# Patient Record
Sex: MALE | Race: BLACK OR AFRICAN AMERICAN | NOT HISPANIC OR LATINO | URBAN - METROPOLITAN AREA
[De-identification: names, ages, dates, MRNs, and addresses within clinical notes are randomized per-mention and may not be internally consistent; named-entity substitution may affect disease eponyms.]

---

## 2017-02-18 PROCEDURE — 74176 CT ABD & PELVIS W/O CONTRAST: CPT | Mod: 26

## 2017-02-18 PROCEDURE — 99284 EMERGENCY DEPT VISIT MOD MDM: CPT | Mod: 25

## 2017-02-19 ENCOUNTER — OUTPATIENT (OUTPATIENT)
Dept: OUTPATIENT SERVICES | Facility: HOSPITAL | Age: 57
LOS: 1 days | End: 2017-02-19

## 2017-02-19 ENCOUNTER — INPATIENT (INPATIENT)
Facility: HOSPITAL | Age: 57
LOS: 7 days | Discharge: HOME | End: 2017-02-27
Payer: MEDICAID

## 2017-02-19 PROCEDURE — 74177 CT ABD & PELVIS W/CONTRAST: CPT | Mod: 26

## 2017-02-19 PROCEDURE — 76705 ECHO EXAM OF ABDOMEN: CPT | Mod: 26

## 2017-02-20 ENCOUNTER — OUTPATIENT (OUTPATIENT)
Dept: OUTPATIENT SERVICES | Facility: HOSPITAL | Age: 57
LOS: 1 days | End: 2017-02-20

## 2017-02-21 ENCOUNTER — OUTPATIENT (OUTPATIENT)
Dept: OUTPATIENT SERVICES | Facility: HOSPITAL | Age: 57
LOS: 1 days | End: 2017-02-21

## 2017-02-21 PROCEDURE — 72146 MRI CHEST SPINE W/O DYE: CPT | Mod: 26

## 2017-02-21 PROCEDURE — 72148 MRI LUMBAR SPINE W/O DYE: CPT | Mod: 26

## 2017-02-22 ENCOUNTER — OUTPATIENT (OUTPATIENT)
Dept: OUTPATIENT SERVICES | Facility: HOSPITAL | Age: 57
LOS: 1 days | End: 2017-02-22

## 2017-02-22 PROCEDURE — 71020: CPT | Mod: 26

## 2017-02-23 ENCOUNTER — OUTPATIENT (OUTPATIENT)
Dept: OUTPATIENT SERVICES | Facility: HOSPITAL | Age: 57
LOS: 1 days | End: 2017-02-23

## 2017-02-24 ENCOUNTER — OUTPATIENT (OUTPATIENT)
Dept: OUTPATIENT SERVICES | Facility: HOSPITAL | Age: 57
LOS: 1 days | End: 2017-02-24

## 2017-02-25 ENCOUNTER — OUTPATIENT (OUTPATIENT)
Dept: OUTPATIENT SERVICES | Facility: HOSPITAL | Age: 57
LOS: 1 days | End: 2017-02-25

## 2017-02-25 PROCEDURE — 76700 US EXAM ABDOM COMPLETE: CPT | Mod: 26

## 2017-02-26 ENCOUNTER — OUTPATIENT (OUTPATIENT)
Dept: OUTPATIENT SERVICES | Facility: HOSPITAL | Age: 57
LOS: 1 days | End: 2017-02-26

## 2017-02-27 ENCOUNTER — OUTPATIENT (OUTPATIENT)
Dept: OUTPATIENT SERVICES | Facility: HOSPITAL | Age: 57
LOS: 1 days | End: 2017-02-27

## 2017-07-11 ENCOUNTER — OUTPATIENT (OUTPATIENT)
Dept: OUTPATIENT SERVICES | Facility: HOSPITAL | Age: 57
LOS: 1 days | End: 2017-07-11

## 2017-07-11 ENCOUNTER — APPOINTMENT (OUTPATIENT)
Dept: CARDIOLOGY | Facility: CLINIC | Age: 57
End: 2017-07-11

## 2017-07-12 ENCOUNTER — OUTPATIENT (OUTPATIENT)
Dept: OUTPATIENT SERVICES | Facility: HOSPITAL | Age: 57
LOS: 1 days | End: 2017-07-12

## 2018-09-16 ENCOUNTER — EMERGENCY (EMERGENCY)
Facility: HOSPITAL | Age: 58
LOS: 1 days | End: 2018-09-16
Payer: MEDICAID

## 2018-09-16 PROCEDURE — 73130 X-RAY EXAM OF HAND: CPT | Mod: 26,RT

## 2018-09-16 PROCEDURE — 99284 EMERGENCY DEPT VISIT MOD MDM: CPT

## 2018-09-16 PROCEDURE — 70450 CT HEAD/BRAIN W/O DYE: CPT | Mod: 26

## 2018-10-06 ENCOUNTER — TRANSCRIPTION ENCOUNTER (OUTPATIENT)
Age: 58
End: 2018-10-06

## 2019-01-14 ENCOUNTER — APPOINTMENT (OUTPATIENT)
Dept: ORTHOPEDIC SURGERY | Facility: CLINIC | Age: 59
End: 2019-01-14

## 2022-02-23 ENCOUNTER — OUTPATIENT (OUTPATIENT)
Dept: OUTPATIENT SERVICES | Facility: HOSPITAL | Age: 62
LOS: 1 days | End: 2022-02-23

## 2022-02-23 DIAGNOSIS — M25.511 PAIN IN RIGHT SHOULDER: ICD-10-CM

## 2022-04-14 ENCOUNTER — OUTPATIENT (OUTPATIENT)
Dept: OUTPATIENT SERVICES | Facility: HOSPITAL | Age: 62
LOS: 1 days | End: 2022-04-14
Payer: OTHER MISCELLANEOUS

## 2022-04-14 DIAGNOSIS — Z01.812 ENCOUNTER FOR PREPROCEDURAL LABORATORY EXAMINATION: ICD-10-CM

## 2022-04-14 DIAGNOSIS — Z01.810 ENCOUNTER FOR PREPROCEDURAL CARDIOVASCULAR EXAMINATION: ICD-10-CM
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
## 2022-04-14 DIAGNOSIS — M25.511 PAIN IN RIGHT SHOULDER: ICD-10-CM
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
## 2022-04-14 DIAGNOSIS — M67.819 OTHER SPECIFIED DISORDERS OF SYNOVIUM AND TENDON, UNSPECIFIED SHOULDER: ICD-10-CM

## 2022-04-14 DIAGNOSIS — M19.011 PRIMARY OSTEOARTHRITIS, RIGHT SHOULDER: ICD-10-CM
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
## 2022-04-14 DIAGNOSIS — S46.219A STRAIN OF MUSCLE, FASCIA AND TENDON OF OTHER PARTS OF BICEPS, UNSPECIFIED ARM, INITIAL ENCOUNTER: ICD-10-CM

## 2022-04-14 DIAGNOSIS — S46.011A STRAIN OF MUSCLE(S) AND TENDON(S) OF THE ROTATOR CUFF OF RIGHT SHOULDER, INITIAL ENCOUNTER: ICD-10-CM

## 2022-04-14 DIAGNOSIS — M19.019 PRIMARY OSTEOARTHRITIS, UNSPECIFIED SHOULDER: ICD-10-CM

## 2022-04-14 PROCEDURE — 93010 ELECTROCARDIOGRAM REPORT: CPT

## 2022-04-28 ENCOUNTER — OUTPATIENT (OUTPATIENT)
Dept: OUTPATIENT SERVICES | Facility: HOSPITAL | Age: 62
LOS: 1 days | End: 2022-04-28
Payer: OTHER MISCELLANEOUS

## 2022-04-28 ENCOUNTER — OUTPATIENT (OUTPATIENT)
Dept: OUTPATIENT SERVICES | Facility: HOSPITAL | Age: 62
LOS: 1 days | End: 2022-04-28

## 2022-04-28 PROCEDURE — 88304 TISSUE EXAM BY PATHOLOGIST: CPT | Mod: 26

## 2022-05-03 DIAGNOSIS — M19.111 POST-TRAUMATIC OSTEOARTHRITIS, RIGHT SHOULDER: ICD-10-CM

## 2022-05-03 DIAGNOSIS — M75.101 UNSPECIFIED ROTATOR CUFF TEAR OR RUPTURE OF RIGHT SHOULDER, NOT SPECIFIED AS TRAUMATIC: ICD-10-CM

## 2022-05-03 DIAGNOSIS — I10 ESSENTIAL (PRIMARY) HYPERTENSION: ICD-10-CM

## 2022-05-03 DIAGNOSIS — M75.41 IMPINGEMENT SYNDROME OF RIGHT SHOULDER: ICD-10-CM

## 2022-05-12 DIAGNOSIS — M75.111 INCOMPLETE ROTATOR CUFF TEAR OR RUPTURE OF RIGHT SHOULDER, NOT SPECIFIED AS TRAUMATIC: ICD-10-CM

## 2022-07-11 DIAGNOSIS — M25.551 PAIN IN RIGHT HIP: ICD-10-CM

## 2022-07-12 ENCOUNTER — APPOINTMENT (OUTPATIENT)
Dept: ORTHOPEDIC SURGERY | Facility: CLINIC | Age: 62
End: 2022-07-12

## 2022-07-27 ENCOUNTER — APPOINTMENT (OUTPATIENT)
Dept: ORTHOPEDIC SURGERY | Facility: CLINIC | Age: 62
End: 2022-07-27

## 2022-07-27 VITALS — WEIGHT: 195 LBS | HEIGHT: 66 IN | BODY MASS INDEX: 31.34 KG/M2

## 2022-07-27 DIAGNOSIS — I10 ESSENTIAL (PRIMARY) HYPERTENSION: ICD-10-CM

## 2022-07-27 PROCEDURE — 99203 OFFICE O/P NEW LOW 30 MIN: CPT

## 2022-07-27 PROCEDURE — 72100 X-RAY EXAM L-S SPINE 2/3 VWS: CPT

## 2022-07-27 PROCEDURE — 99072 ADDL SUPL MATRL&STAF TM PHE: CPT

## 2022-07-27 NOTE — WORK
[Sprain/Strain] : sprain/strain [Was the competent medical cause of the injury] : was the competent medical cause of the injury [Are consistent with the injury] : are consistent with the injury [Consistent with my objective findings] : consistent with my objective findings [Severe Partial] : severe partial [Reveals pre-existing condition(s) that may affect treatment/prognosis] : reveals pre-existing condition(s) that may affect treatment/prognosis [Cannot return to work because ________] : cannot return to work because [unfilled] [Bending/Twisting] : bending/twisting [Climbing stairs/Ladders] : climbing stairs/ladders [Lifting] : lifting [Walking] : walking [Pulling/Pushing] : pulling/pushing [Standing] : standing [15+ days] : 15+ days [Patient] : patient [No] : No [No Rx restrictions] : No Rx restrictions. [I provided the services listed above] :  I provided the services listed above. [FreeTextEntry1] : fair [FreeTextEntry2] : prior T12 laminectomy.  Prior lumbar discectomy

## 2022-07-27 NOTE — DATA REVIEWED
[Lumbar Spine] : lumbar spine [MRI] : MRI [Right] : of the right [Hip] : hip [Report was reviewed and noted in the chart] : The report was reviewed and noted in the chart [FreeTextEntry1] : REPORT ONLY Lumbar spine MRI taken (04/26/22) - [facility] \par L5-S1 mild foraminal stenosis and prior left laminectomy \par L4-5 mild foraminal stenosis \par L3-4 disc bulge mild stenosis \par L2-3 disc bulge, mild stenosis \par L1-2 normal   [FreeTextEntry2] : (05/02/22) - [facility]: Normal

## 2022-07-27 NOTE — PHYSICAL EXAM
[5___] : right extensor hallicus longus 5[unfilled]/5 [Outside films reviewed] : Outside films reviewed [4___] : right plantor flexors 4[unfilled]/5 [de-identified] : Constitutional:\par -  General Appearance: \par Unremarkable\par Body Habitus\par Well Developed \par Well Nourished\par Body Habitus\par No Deformities\par Well Groomed\par \par Ability To communicate:\par Normal\par \par Neurologic: \par Global sensation is intact to upper and lower extremities.  See examination of Neck and/or Spine for exceptions.\par Orientation to Time, Place and Person is: Normal\par Mood And Affect is Normal\par \par Skin:\par -  Head/Face, Right Upper/Lower Extremity, Left Upper/Lower Extremity: Normal\par See Examination of Neck and/or Spine for exceptions\par \par Cardiovascular:\par Peripheral Cardiovascular System is Normal\par \par Palpation of Lymph Nodes:\par Normal Palpation of lymph nodes in: Axilla, Cervical, Inguinal\par Abnormal Palpation of lymph nodes in: None  [] : non-antalgic [FreeTextEntry3] : incision in the lower thoracic spine and lumbar spine  [de-identified] : Right L5 and S1 paresthesias [FreeTextEntry1] : Lumbar Spine X-Rays Taken (07/25/22) - [facility]HOUSE [facility]\par On my interpretation of the images\par 1) T11-12 Laminectomy\par 2) Multi-level degenerative disease throughout the lumbar spine [TWNoteComboBox7] : forward flexion 30 degrees [de-identified] : extension 10 degrees [de-identified] : left lateral bending 20 degrees [de-identified] : left lateral rotation 20 degrees [de-identified] : right lateral bending 20 degrees [TWNoteComboBox6] : right lateral rotation 20 degrees

## 2022-07-27 NOTE — HISTORY OF PRESENT ILLNESS
[Work related] : work related [Sudden] : sudden [10] : 10 [9] : 9 [Radiating] : radiating [Sharp] : sharp [Shooting] : shooting [Tingling] : tingling [Constant] : constant [Nothing helps with pain getting better] : Nothing helps with pain getting better [Walking] : walking [Lying in bed] : lying in bed [Not working due to injury] : Work status: not working due to injury [de-identified] : 63 y/o male presents for his initial consultation. This is a worker's comp visit. Patient was previously evaluated by me at [place]. Patient c/o lower back pain that started on 03/07/22 while working at [employer]. Patient reports that he begun to experience lower back pain after lifting an 80 lbs. box. Patient c/o lower back and right leg pain that has been present since the accident in March. Patient has been undergoing PT which he did not find helpful. Patient has undergone TPI's with no relief. Patient c/o painful crepitus with ROM in his lower back.\par \par W/c DOI: 03/07/22\par Occupation: [employer] \par Working Status: OOW [] : no [FreeTextEntry3] : 3/7/22 [FreeTextEntry5] : Patient picked up a heavy box and heard a popping sound in back [FreeTextEntry7] : right leg [de-identified] : 3/10/22 [de-identified] : urgent care [de-identified] : MRI done at [facility] [de-identified] : shira

## 2022-07-27 NOTE — ASSESSMENT
[FreeTextEntry1] : 61 y/o male with lumbar radiculopathy. I instructed the patient to obtain a copy of his last MRI for my review.  He's already been through PT under the direction of Dr. Michele.  Patient was prescribed diclofenac for pain relief. I would like to follow up with the patient in 1-2 weeks to assess his previous records. \par \par Prior to appointment and during encounter with patient extensive medical records were reviewed including but not limited to, hospital records, out patient records, imaging results, and lab data. During this appointment the patient was examined, diagnoses were discussed and explained in a face to face manner. In addition extensive time was spent reviewing aforementioned diagnostic studies. Counseling including abnormal image results, differential diagnoses, treatment options, risk and benefits, lifestyle changes, current condition, and current medications was performed. Patient's comments, questions, and concerns were address and patient verbalized understanding. Based on this patient's presentation at our office, which is an orthopedic spine surgeon's office, this patient inherently / intrinsically has a risk, however minute, of developing issues such as Cauda equina syndrome, bowel and bladder changes, or progression of motor or neurological deficits such as paralysis which may be permanent.\par \par I, Kendall Nieves, attest that this documentation has been prepared under the direction and in the presence of provider Philipp Wilson MD.

## 2022-08-03 ENCOUNTER — APPOINTMENT (OUTPATIENT)
Dept: ORTHOPEDIC SURGERY | Facility: CLINIC | Age: 62
End: 2022-08-03

## 2022-08-03 VITALS — BODY MASS INDEX: 31.34 KG/M2 | WEIGHT: 195 LBS | HEIGHT: 66 IN

## 2022-08-03 PROCEDURE — 99072 ADDL SUPL MATRL&STAF TM PHE: CPT

## 2022-08-03 PROCEDURE — 99214 OFFICE O/P EST MOD 30 MIN: CPT

## 2022-08-12 NOTE — PHYSICAL EXAM
[NL (90)] : forward flexion 90 degrees [NL (30)] : right lateral rotation 30 degrees [NL (45)] : extension 45 degrees [NL (40)] : right lateral bending 40 degrees [5___] : right extensor hallicus longus 5[unfilled]/5 [de-identified] : Constitutional:\par -  General Appearance: \par Unremarkable\par Body Habitus\par Well Developed \par Well Nourished\par Body Habitus\par No Deformities\par Well Groomed\par \par Ability To communicate:\par Normal\par \par Neurologic: \par Global sensation is intact to upper and lower extremities.  See examination of Neck and/or Spine for exceptions.\par Orientation to Time, Place and Person is: Normal\par Mood And Affect is Normal\par \par Skin:\par -  Head/Face, Right Upper/Lower Extremity, Left Upper/Lower Extremity: Normal\par See Examination of Neck and/or Spine for exceptions\par \par Cardiovascular:\par Peripheral Cardiovascular System is Normal\par \par Palpation of Lymph Nodes:\par Normal Palpation of lymph nodes in: Axilla, Cervical, Inguinal\par Abnormal Palpation of lymph nodes in: None  [] : non-antalgic

## 2022-08-12 NOTE — HISTORY OF PRESENT ILLNESS
[10] : 10 [Not working due to injury] : Work status: not working due to injury [de-identified] : 63 y/o male presents for re-evaluation. This is a worker's comp visit. Patient c/o lower back pain radiating into his right calf. Patient c/o paresthesias in his lower extremity. Patient previously underwent LUÍS's with Dr. Clinton which he did not find helpful in reducing his pain. \par \par W/c DOI: 03/07/22\par Occupation: [employer] \par Working Status: OOW  [] : no

## 2022-08-12 NOTE — WORK
[Total] : total [Cannot return to work because ________] : cannot return to work because [unfilled] [Patient] : patient [No Rx restrictions] : No Rx restrictions. [I provided the services listed above] :  I provided the services listed above. [FreeTextEntry1] : temporary

## 2022-08-12 NOTE — ASSESSMENT
[FreeTextEntry1] : 61 y/o male with lumbar stenosis. As the patient has been experiencing worsening lower back and lower extremity pain over the past 6 months that is refractory to conservative treatments consist of pain management intervention and PT, and as the patient has evidence for prior discectomy, I am requesting an updated lumbar MRI with and without contrast to evaluate for progressive stenosis in consideration of surgical intervention. I would like to follow up with the patient in 3-4 weeks to assess his MRI results. \par \par Prior to appointment and during encounter with patient extensive medical records were reviewed including but not limited to, hospital records, out patient records, imaging results, and lab data. During this appointment the patient was examined, diagnoses were discussed and explained in a face to face manner. In addition extensive time was spent reviewing aforementioned diagnostic studies. Counseling including abnormal image results, differential diagnoses, treatment options, risk and benefits, lifestyle changes, current condition, and current medications was performed. Patient's comments, questions, and concerns were address and patient verbalized understanding. Based on this patient's presentation at our office, which is an orthopedic spine surgeon's office, this patient inherently / intrinsically has a risk, however minute, of developing issues such as Cauda equina syndrome, bowel and bladder changes, or progression of motor or neurological deficits such as paralysis which may be permanent.\par \par I, Kendall Nieves, attest that this documentation has been prepared under the direction and in the presence of provider Philipp Wilson MD.

## 2022-09-04 ENCOUNTER — FORM ENCOUNTER (OUTPATIENT)
Age: 62
End: 2022-09-04

## 2022-09-05 ENCOUNTER — APPOINTMENT (OUTPATIENT)
Dept: MRI IMAGING | Facility: CLINIC | Age: 62
End: 2022-09-05

## 2022-09-05 PROCEDURE — 72158 MRI LUMBAR SPINE W/O & W/DYE: CPT

## 2022-09-05 PROCEDURE — 99072 ADDL SUPL MATRL&STAF TM PHE: CPT

## 2022-09-09 ENCOUNTER — APPOINTMENT (OUTPATIENT)
Dept: ORTHOPEDIC SURGERY | Facility: CLINIC | Age: 62
End: 2022-09-09

## 2022-09-09 VITALS — HEIGHT: 66 IN | WEIGHT: 195 LBS | BODY MASS INDEX: 31.34 KG/M2

## 2022-09-09 DIAGNOSIS — M54.16 RADICULOPATHY, LUMBAR REGION: ICD-10-CM

## 2022-09-09 DIAGNOSIS — Z78.9 OTHER SPECIFIED HEALTH STATUS: ICD-10-CM

## 2022-09-09 DIAGNOSIS — F17.200 NICOTINE DEPENDENCE, UNSPECIFIED, UNCOMPLICATED: ICD-10-CM

## 2022-09-09 PROCEDURE — 99072 ADDL SUPL MATRL&STAF TM PHE: CPT

## 2022-09-09 PROCEDURE — 99214 OFFICE O/P EST MOD 30 MIN: CPT

## 2022-09-09 NOTE — DATA REVIEWED
[MRI] : MRI [Lumbar Spine] : lumbar spine [I independently reviewed and interpreted images and report] : I independently reviewed and interpreted images and report [FreeTextEntry1] : Lab work done on 08/31/2022 - [facility] \par Wbc 9.9\par esr 43.0\par CRP 0.4

## 2022-09-09 NOTE — HISTORY OF PRESENT ILLNESS
[de-identified] : 63 y/o male presents for MRI follow up. Eve reports that on July 26, 2022 he underwent an LUÍS with [facility], on 08/05/22, he presented to [place] for difficulty walking. Patient reports that he was evaluated by a neurosurgeon. Patient was given an IV Pic line and did not undergo surgery. Patient c/o lower back pain radiating into his right lower extremity. Patient reports that I&D advised him that the infection was improving. \par \par W/c DOI: 03/07/22\par Occupation: [employer] \par Working Status: OOW

## 2022-09-09 NOTE — ASSESSMENT
[FreeTextEntry1] : 61 y/o male with RLE radiculopathy from foraminal stenosis and R foraminal HNP.  This is a worker's comp visit.  He is s/p LUÍS with an outside PM provider in July and after last seen in the office proceeded to [facility] ED and started treatment for a spine infection.  Was consulted on by neurosurgery and was decided that he did not need surgical intervention.  ID is treating with IV abx via PICC line.  Sonu does not have all the details of treatment with him and I'm having some trouble piecing together the timeline.  \par \par I advised the patient to obtain a copy of the MRI CD and Labs done in [facility]. I discussed the importance of comparing his MRI taken during his possible active infection against his current MRI (which now in retrospect) is several weeks s/p abx treatment.  I advised the patient to continue his treatment with Infectious disease. I discussed with the patient that if he is refractory to conservative treatment then he may be a potential surgical candidate for laminectomy and fusion to relieve the foraminal stenosis.   I would like to follow up with the patient in 4-6 weeks  to assess his response to conservative care. \par \par Prior to appointment and during encounter with patient extensive medical records were reviewed including but not limited to, hospital records, out patient records, imaging results, and lab data. During this appointment the patient was examined, diagnoses were discussed and explained in a face to face manner. In addition extensive time was spent reviewing aforementioned diagnostic studies. Counseling including abnormal image results, differential diagnoses, treatment options, risk and benefits, lifestyle changes, current condition, and current medications was performed. Patient's comments, questions, and concerns were address and patient verbalized understanding. Based on this patient's presentation at our office, which is an orthopedic spine surgeon's office, this patient inherently / intrinsically has a risk, however minute, of developing issues such as Cauda equina syndrome, bowel and bladder changes, or progression of motor or neurological deficits such as paralysis which may be permanent.\par \par I, Kendall Nieves, attest that this documentation has been prepared under the direction and in the presence of provider Philipp Wilson MD.

## 2022-09-09 NOTE — PHYSICAL EXAM
[NL (90)] : forward flexion 90 degrees [NL (30)] : right lateral rotation 30 degrees [NL (45)] : extension 45 degrees [NL (40)] : right lateral bending 40 degrees [5___] : right extensor hallicus longus 5[unfilled]/5 [de-identified] : Constitutional:\par -  General Appearance: \par Unremarkable\par Body Habitus\par Well Developed \par Well Nourished\par Body Habitus\par No Deformities\par Well Groomed\par \par Ability To communicate:\par Normal\par \par Neurologic: \par Global sensation is intact to upper and lower extremities.  See examination of Neck and/or Spine for exceptions.\par Orientation to Time, Place and Person is: Normal\par Mood And Affect is Normal\par \par Skin:\par -  Head/Face, Right Upper/Lower Extremity, Left Upper/Lower Extremity: Normal\par See Examination of Neck and/or Spine for exceptions\par \par Cardiovascular:\par Peripheral Cardiovascular System is Normal\par \par Palpation of Lymph Nodes:\par Normal Palpation of lymph nodes in: Axilla, Cervical, Inguinal\par Abnormal Palpation of lymph nodes in: None  [] : non-antalgic [de-identified] : Right L4 and L5 paresthesias

## 2022-09-16 ENCOUNTER — NON-APPOINTMENT (OUTPATIENT)
Age: 62
End: 2022-09-16

## 2022-10-07 ENCOUNTER — APPOINTMENT (OUTPATIENT)
Dept: ORTHOPEDIC SURGERY | Facility: CLINIC | Age: 62
End: 2022-10-07

## 2022-10-07 VITALS — HEIGHT: 66 IN | BODY MASS INDEX: 31.34 KG/M2 | WEIGHT: 195 LBS

## 2022-10-07 PROCEDURE — 99072 ADDL SUPL MATRL&STAF TM PHE: CPT

## 2022-10-07 PROCEDURE — 99214 OFFICE O/P EST MOD 30 MIN: CPT

## 2022-10-07 NOTE — PHYSICAL EXAM
[NL (90)] : forward flexion 90 degrees [NL (30)] : right lateral bending 30 degrees [NL (45)] : extension 45 degrees [NL (40)] : right lateral bending 40 degrees [5___] : right extensor hallicus longus 5[unfilled]/5 [de-identified] : Constitutional:\par -  General Appearance: \par Unremarkable\par Body Habitus\par Well Developed \par Well Nourished\par Body Habitus\par No Deformities\par Well Groomed\par \par Ability To communicate:\par Normal\par \par Neurologic: \par Global sensation is intact to upper and lower extremities.  See examination of Neck and/or Spine for exceptions.\par Orientation to Time, Place and Person is: Normal\par Mood And Affect is Normal\par \par Skin:\par -  Head/Face, Right Upper/Lower Extremity, Left Upper/Lower Extremity: Normal\par See Examination of Neck and/or Spine for exceptions\par \par Cardiovascular:\par Peripheral Cardiovascular System is Normal\par \par Palpation of Lymph Nodes:\par Normal Palpation of lymph nodes in: Axilla, Cervical, Inguinal\par Abnormal Palpation of lymph nodes in: None  [] : non-antalgic [FreeTextEntry3] : healed incision at L5-S1 [de-identified] : Right L4-L5 paresthesias

## 2022-10-07 NOTE — DATA REVIEWED
[Erythrocyte Sedimentation Rate] : Erythrocyte Sedimentation Rate [C-reactive Protein] : C-reactive Protein [Laboratory studies were reviewed and noted in the chart] : Laboratory studies were reviewed and noted in the chart [Other: _____] : [unfilled] [MRI] : MRI [Lumbar Spine] : lumbar spine [Report was reviewed and noted in the chart] : The report was reviewed and noted in the chart [I independently reviewed and interpreted images and report] : I independently reviewed and interpreted images and report [I reviewed the films/CD and additionally noted] : I reviewed the films/CD and additionally noted [FreeTextEntry1] : Imaging Review:\par MRI Lumbar spine 09/01/2022 - [facility]\par L4-L5 abnormal soft tissue enhancement around right L4 nerve in foramin \par \par MRI Lumbar spine 09/05/2022 - [facility] \par L5-S1 prior discectomy left side\par L4-L5 bilateral facet arthitis \par Severe right side foraminal stenosis L4 nerve root compression \par Contrast enhancement in right foraminal region over the L4 nerve root \par Mild to moderate multilevel DDD\par \par MRI Lumbar spine 09/15/2022 - [facility]\par Right L4-L5 swelling and a possibility for infection \par \par Laboratory Studies:\par 9/14/2022 \par ESR 50, elevated\par CRP 0.5 normal\par \par 9/19/2022\par WBC 6.1 normal

## 2022-10-07 NOTE — ASSESSMENT
[FreeTextEntry1] : 63 y/o male with RLE radiculopathy from foraminal stenosis and R foraminal HNP.  This is a worker's comp visit.  I advised the patient to continue his treatment with Infectious disease. I discussed with the patient that if he is refractory to conservative treatment then he may be a potential surgical candidate. Discussed at length non-operative and operative treatment options including L4-L5 laminectomy fusion with full right facet resection. Advised him that we should hold off on surgical intervention to confirm infection resolves before operating.  All questions and concerns regarding the surgery were addressed. Follow up in 3 weeks to further discuss surgical intervention.\par \par Prior to appointment and during encounter with patient extensive medical records were reviewed including but not limited to, hospital records, out patient records, imaging results, and lab data. During this appointment the patient was examined, diagnoses were discussed and explained in a face to face manner. In addition extensive time was spent reviewing aforementioned diagnostic studies. Counseling including abnormal image results, differential diagnoses, treatment options, risk and benefits, lifestyle changes, current condition, and current medications was performed. Patient's comments, questions, and concerns were address and patient verbalized understanding. Based on this patient's presentation at our office, which is an orthopedic spine surgeon's office, this patient inherently / intrinsically has a risk, however minute, of developing issues such as Cauda equina syndrome, bowel and bladder changes, or progression of motor or neurological deficits such as paralysis which may be permanent.\par \par \par I, Faye Sherman, attest that this documentation has been prepared under the direction and in the presence of provider Philipp Wilson MD.\par

## 2022-10-07 NOTE — HISTORY OF PRESENT ILLNESS
[de-identified] : 63 y/o male presents for a  follow-up visit. Patient reports that he presented to [facility] recently for difficulty walking and an associated tingling sensation to his bilateral lower extremities. He was treated for a blood infection. Patient is currently taking Vancomycin for the infection. Patient reports that he was evaluated by a neurosurgeon. Patient complains of lower back pain radiating into his right lower extremity at this time. Patient reports that I&D advised him that the infection was improving. \par \par W/c DOI: 03/07/22\par Occupation: [employer] \par Working Status: OOW

## 2022-11-01 ENCOUNTER — FORM ENCOUNTER (OUTPATIENT)
Age: 62
End: 2022-11-01

## 2022-11-02 ENCOUNTER — APPOINTMENT (OUTPATIENT)
Dept: ORTHOPEDIC SURGERY | Facility: CLINIC | Age: 62
End: 2022-11-02

## 2022-11-02 VITALS — BODY MASS INDEX: 31.34 KG/M2 | HEIGHT: 66 IN | WEIGHT: 195 LBS

## 2022-11-02 DIAGNOSIS — M46.26 OSTEOMYELITIS OF VERTEBRA, LUMBAR REGION: ICD-10-CM

## 2022-11-02 PROCEDURE — 99072 ADDL SUPL MATRL&STAF TM PHE: CPT

## 2022-11-02 PROCEDURE — 99214 OFFICE O/P EST MOD 30 MIN: CPT

## 2022-11-02 NOTE — ASSESSMENT
[FreeTextEntry1] : 63 y/o male with RLE radiculopathy from foraminal stenosis and R foraminal HNP.  This is a worker's comp visit. MRI showing advanced right sided L4-5 nerve compression. He has been treated for a post-injection potential spine infection by [facility].  It hasn't been clear to me that it was truly an infection vs a facet joint inflammation.  Patient is a candidate for L4-L5 laminectomy fusion with full right facetotomy and a posterior spine fusion for iatrogenic instability. All questions and concerns regarding the surgery were addressed.\par \par Prior to appointment and during encounter with patient extensive medical records were reviewed including but not limited to, hospital records, out patient records, imaging results, and lab data. During this appointment the patient was examined, diagnoses were discussed and explained in a face to face manner. In addition extensive time was spent reviewing aforementioned diagnostic studies. Counseling including abnormal image results, differential diagnoses, treatment options, risk and benefits, lifestyle changes, current condition, and current medications was performed. Patient's comments, questions, and concerns were address and patient verbalized understanding. Based on this patient's presentation at our office, which is an orthopedic spine surgeon's office, this patient inherently / intrinsically has a risk, however minute, of developing issues such as Cauda equina syndrome, bowel and bladder changes, or progression of motor or neurological deficits such as paralysis which may be permanent.\par \par ARTUR, Ondina Narvaez, attest that this documentation has been prepared under the direction and in the presence of provider Philipp Wilson MD.\par

## 2022-11-02 NOTE — DATA REVIEWED
[MRI] : MRI [Lumbar Spine] : lumbar spine [Report was reviewed and noted in the chart] : The report was reviewed and noted in the chart [I independently reviewed and interpreted images and report] : I independently reviewed and interpreted images and report [I reviewed the films/CD and additionally noted] : I reviewed the films/CD and additionally noted [FreeTextEntry1] : 11/2/22 [facility] \par L4-5 severe right sided foraminal stenosis with increased fluid signal of the right L4-5 facet joint \par Mild increase in signal of muscular tissue on the right side surrounding the right facet joint on the contrast sequences but unclear if this is infection or inflammatory responses. \par

## 2022-11-02 NOTE — PHYSICAL EXAM
[5___] : right extensor hallicus longus 5[unfilled]/5 [4___] : right dorsiflexors 4[unfilled]/5 [de-identified] : Constitutional:\par -  General Appearance: \par Unremarkable\par Body Habitus\par Well Developed \par Well Nourished\par Body Habitus\par No Deformities\par Well Groomed\par \par Ability To communicate:\par Normal\par \par Neurologic: \par Global sensation is intact to upper and lower extremities.  See examination of Neck and/or Spine for exceptions.\par Orientation to Time, Place and Person is: Normal\par Mood And Affect is Normal\par \par Skin:\par -  Head/Face, Right Upper/Lower Extremity, Left Upper/Lower Extremity: Normal\par See Examination of Neck and/or Spine for exceptions\par \par Cardiovascular:\par Peripheral Cardiovascular System is Normal\par \par Palpation of Lymph Nodes:\par Normal Palpation of lymph nodes in: Axilla, Cervical, Inguinal\par Abnormal Palpation of lymph nodes in: None  [] : non-antalgic [FreeTextEntry3] : healed incision at L5-S1 [FreeTextEntry8] : mild troch tenderness on the right [de-identified] : L4-5 right sided paraesthesia  [de-identified] : Right L4-L5 paresthesias  [TWNoteComboBox7] : forward flexion 45 degrees [de-identified] : extension 5 degrees

## 2022-11-02 NOTE — HISTORY OF PRESENT ILLNESS
[9] : 9 [Not working due to injury] : Work status: not working due to injury [] : yes [de-identified] : 63 y/o male presents for a  follow-up visit. Patient states he has b/l leg, right is worse than the left pain. Patient states his PICC line was removed and he has been on Vancomycin for 2 weeks. He is being follow by neuro as well. Patient manly complains of lower back pain radiating into his right lower extremity at this time. Patient reports that I&D advised him that the blood infection was improving. \par \par W/c DOI: 03/07/22\par Occupation: [employer] \par Working Status: OOW \par \par L45 lmain with full rihg ffacroty and a postioe spine fusion for aiaotrginc instability

## 2022-11-16 ENCOUNTER — FORM ENCOUNTER (OUTPATIENT)
Age: 62
End: 2022-11-16

## 2022-11-30 ENCOUNTER — FORM ENCOUNTER (OUTPATIENT)
Age: 62
End: 2022-11-30

## 2022-12-16 ENCOUNTER — APPOINTMENT (OUTPATIENT)
Dept: ORTHOPEDIC SURGERY | Facility: CLINIC | Age: 62
End: 2022-12-16

## 2022-12-16 VITALS — BODY MASS INDEX: 31.34 KG/M2 | HEIGHT: 66 IN | WEIGHT: 195 LBS

## 2022-12-16 PROCEDURE — 99214 OFFICE O/P EST MOD 30 MIN: CPT

## 2022-12-16 PROCEDURE — 99072 ADDL SUPL MATRL&STAF TM PHE: CPT

## 2022-12-18 ENCOUNTER — FORM ENCOUNTER (OUTPATIENT)
Age: 62
End: 2022-12-18

## 2022-12-21 ENCOUNTER — APPOINTMENT (OUTPATIENT)
Dept: ORTHOPEDIC SURGERY | Facility: CLINIC | Age: 62
End: 2022-12-21

## 2022-12-21 NOTE — ASSESSMENT
[FreeTextEntry1] : 63 y/o male with RLE radiculopathy from foraminal stenosis and R foraminal HNP.  This is a worker's comp visit. Discussed non-operative and operative treatment options including L4-L5 laminectomy fusion with full right facetotomy and a posterior spine fusion for iatrogenic instability. All questions and concerns regarding the surgery were addressed. Went over the recovery timeline and expected outcomes following surgery. Patient elected to move forward with the surgical procedure.\par \par I have indicated the patient for a Laminectomy & Fusion.  We've discussed the surgery details including instrumentation and grafting options (local, allograft, ICBG, and biologics) as well as potential for complications including but not limited to pain, scar and infection. There is also a possibility for hardware complication such as malposition of hardware, hardware loosening, pullout, failure or fracture of bone, adjacent segment disease, pseudarthrosis, and need for future surgery. Finally, we discussed potential for injury to nerves, spinal cord or blood vessels, paralysis, blindness, need for transfusion, general anesthesia, allergic reaction, prolonged intubation, myocardial infarction, stroke, deep venous thrombosis, pulmonary embolus, and death. The patient understands these things and all questions are answered to his/her satisfaction.Patient has been instructed to stop all Aspirin and NSAIDs 10 days prior to surgery date.  For Coumadin and other blood thinners, the patient is referred to the medical doctor. The patient will need a medical clearance and pre-admission testing prior to surgery\par \par Prior to appointment and during encounter with patient extensive medical records were reviewed including but not limited to, hospital records, out patient records, imaging results, and lab data. During this appointment the patient was examined, diagnoses were discussed and explained in a face to face manner. In addition extensive time was spent reviewing aforementioned diagnostic studies. Counseling including abnormal image results, differential diagnoses, treatment options, risk and benefits, lifestyle changes, current condition, and current medications was performed. Patient's comments, questions, and concerns were address and patient verbalized understanding. Based on this patient's presentation at our office, which is an orthopedic spine surgeon's office, this patient inherently / intrinsically has a risk, however minute, of developing issues such as Cauda equina syndrome, bowel and bladder changes, or progression of motor or neurological deficits such as paralysis which may be permanent.\par \par I, Seema Patrick attest that this documentation has been prepared under the direction and in the presence of Provider Dr. Philipp Wilson. \par

## 2022-12-21 NOTE — REASON FOR VISIT
[FreeTextEntry2] : wc 3/7/22 f/u l-spine  I personally saw the patient with the PA, and completed the key components of the history and physical exam. I then discussed the management plan with the PA.  gen in mild resp distress cardiac + tachycardia resp + inspiratory/expiratory wheeze abd soft neuro intact

## 2022-12-21 NOTE — HISTORY OF PRESENT ILLNESS
[8] : 8 [10] : 10 [Radiating] : radiating [Tingling] : tingling [Not working due to injury] : Work status: not working due to injury [] : yes [de-identified] : 12/16/22:  61 y/o male presents for a  follow-up visit. Patient states he has persistent b/l leg pain indicating that right is more severe than the left. Patient mainly complains of lower back pain radiating into his right lower extremity at this time. Patient reports no improvement in symptoms since prior visit. Patient has been approved through  for L4-L5 laminectomy fusion with full right facetotomy and a posterior spine fusion for iatrogenic instability. \par \par 11/02/22: 61 y/o male presents for a  follow-up visit. Patient states he has b/l leg, right is worse than the left pain. Patient states his PICC line was removed and he has been on Vancomycin for 2 weeks. He is being follow by neuro as well. Patient manly complains of lower back pain radiating into his right lower extremity at this time. Patient reports that I&D advised him that the blood infection was improving. \par \par W/c DOI: 03/07/22\par Occupation: [employer] \par Working Status: OOW \par \par L45 lmain with full rihg ffacroty and a postioe spine fusion for aiaotrginc instability  [FreeTextEntry7] : rt leg/thigh/calf [de-identified] : pain mgmt

## 2023-01-04 ENCOUNTER — APPOINTMENT (OUTPATIENT)
Dept: ORTHOPEDIC SURGERY | Facility: CLINIC | Age: 63
End: 2023-01-04
Payer: OTHER MISCELLANEOUS

## 2023-01-04 VITALS — BODY MASS INDEX: 31.34 KG/M2 | HEIGHT: 66 IN | WEIGHT: 195 LBS

## 2023-01-04 PROCEDURE — 99072 ADDL SUPL MATRL&STAF TM PHE: CPT

## 2023-01-04 PROCEDURE — 99213 OFFICE O/P EST LOW 20 MIN: CPT

## 2023-01-04 NOTE — HISTORY OF PRESENT ILLNESS
[de-identified] : Patient presents to the office for preoperative visit in follow. He scheduled for lumbar laminectomy L4/5 on one/17/23 at [facility]. Workmen’s Compensation authorizations noted. Presurgical testing has not yet completed. Medical clearance has not yet been completed but the patient plans on seeing primary care physician Dr. Kaplan at [facility] in [place] in the near future.\par \par He continues to complain of right leg pain, right side, and lower back pain and buttocks pain.

## 2023-01-04 NOTE — DISCUSSION/SUMMARY
[Surgical risks reviewed] : Surgical risks reviewed [de-identified] : Together in the office we reviewed the current diagnosis and its contributions to His symptoms.  The planned surgical procedure was reviewed and explained to His satisfaction including the risks and benefits.  This risk benefit conversation included, but was not limited to infection, bleeding, neurological injury, CSF leak, need for dural repair, need for additional operation in the future, risks of general anesthesia. Expected outcomes included reduction in pain, improvement in function and expected recovery timeframe. All questions were answered to their satisfaction.\par \par Patient was reminded to bring their diagnostic imaging to the OR on the date of surgery.\par \par \par \par \par

## 2023-01-04 NOTE — PHYSICAL EXAM
[Bending to left] : bending to left [Bending to right] : bending to right [Flexion] : flexion [Extension] : extension [] : patient ambulates without assistive device

## 2023-01-10 ENCOUNTER — FORM ENCOUNTER (OUTPATIENT)
Age: 63
End: 2023-01-10

## 2023-01-10 RX ORDER — MUPIROCIN 20 MG/G
2 OINTMENT TOPICAL 3 TIMES DAILY
Qty: 1 | Refills: 0 | Status: ACTIVE | COMMUNITY
Start: 2023-01-10 | End: 1900-01-01

## 2023-01-13 ENCOUNTER — RESULT REVIEW (OUTPATIENT)
Age: 63
End: 2023-01-13

## 2023-01-13 ENCOUNTER — APPOINTMENT (OUTPATIENT)
Dept: PHYSICAL MEDICINE AND REHAB | Facility: CLINIC | Age: 63
End: 2023-01-13
Payer: OTHER MISCELLANEOUS

## 2023-01-13 VITALS
HEART RATE: 54 BPM | RESPIRATION RATE: 16 BRPM | OXYGEN SATURATION: 98 % | TEMPERATURE: 96.5 F | BODY MASS INDEX: 34.23 KG/M2 | SYSTOLIC BLOOD PRESSURE: 156 MMHG | HEIGHT: 66 IN | WEIGHT: 213 LBS | DIASTOLIC BLOOD PRESSURE: 82 MMHG

## 2023-01-13 DIAGNOSIS — R94.31 ABNORMAL ELECTROCARDIOGRAM [ECG] [EKG]: ICD-10-CM

## 2023-01-13 DIAGNOSIS — F17.210 NICOTINE DEPENDENCE, CIGARETTES, UNCOMPLICATED: ICD-10-CM

## 2023-01-13 DIAGNOSIS — R00.1 BRADYCARDIA, UNSPECIFIED: ICD-10-CM

## 2023-01-13 DIAGNOSIS — Z01.818 ENCOUNTER FOR OTHER PREPROCEDURAL EXAMINATION: ICD-10-CM

## 2023-01-13 DIAGNOSIS — I10 ESSENTIAL (PRIMARY) HYPERTENSION: ICD-10-CM

## 2023-01-13 PROCEDURE — 99204 OFFICE O/P NEW MOD 45 MIN: CPT | Mod: ACP

## 2023-01-13 PROCEDURE — 99072 ADDL SUPL MATRL&STAF TM PHE: CPT

## 2023-01-13 RX ORDER — METHOCARBAMOL 500 MG/1
500 TABLET, FILM COATED ORAL
Qty: 20 | Refills: 0 | Status: DISCONTINUED | COMMUNITY
Start: 2022-03-09 | End: 2023-01-13

## 2023-01-13 RX ORDER — MELOXICAM 15 MG/1
15 TABLET ORAL
Qty: 30 | Refills: 2 | Status: DISCONTINUED | COMMUNITY
Start: 2022-04-04 | End: 2023-01-13

## 2023-01-13 RX ORDER — NICOTINE 21 MG/24HR
14 PATCH, TRANSDERMAL 24 HOURS TRANSDERMAL DAILY
Qty: 30 | Refills: 0 | Status: ACTIVE | COMMUNITY
Start: 2023-01-13

## 2023-01-13 RX ORDER — PREGABALIN 50 MG/1
50 CAPSULE ORAL 3 TIMES DAILY
Qty: 90 | Refills: 0 | Status: DISCONTINUED | COMMUNITY
Start: 2022-12-16 | End: 2023-01-13

## 2023-01-13 RX ORDER — LOSARTAN POTASSIUM 100 MG/1
100 TABLET, FILM COATED ORAL
Qty: 90 | Refills: 1 | Status: ACTIVE | COMMUNITY
Start: 2022-04-23

## 2023-01-13 RX ORDER — OXYCODONE 5 MG/1
5 TABLET ORAL
Qty: 7 | Refills: 0 | Status: DISCONTINUED | COMMUNITY
Start: 2022-06-22 | End: 2023-01-13

## 2023-01-13 RX ORDER — DICLOFENAC SODIUM 75 MG/1
75 TABLET, DELAYED RELEASE ORAL DAILY
Qty: 30 | Refills: 2 | Status: DISCONTINUED | COMMUNITY
Start: 2022-07-21 | End: 2023-01-13

## 2023-01-13 RX ORDER — METOPROLOL SUCCINATE 25 MG/1
25 TABLET, EXTENDED RELEASE ORAL DAILY
Qty: 90 | Refills: 1 | Status: ACTIVE | COMMUNITY

## 2023-01-13 RX ORDER — OXYCODONE AND ACETAMINOPHEN 5; 325 MG/1; MG/1
5-325 TABLET ORAL TWICE DAILY
Qty: 1 | Refills: 0 | Status: ACTIVE | COMMUNITY
Start: 2023-01-13

## 2023-01-13 RX ORDER — HYDROCHLOROTHIAZIDE 25 MG/1
25 TABLET ORAL DAILY
Qty: 90 | Refills: 1 | Status: ACTIVE | COMMUNITY

## 2023-01-13 RX ORDER — HYDROCHLOROTHIAZIDE 25 MG/1
25 TABLET ORAL
Qty: 90 | Refills: 0 | Status: DISCONTINUED | COMMUNITY
Start: 2022-04-25 | End: 2023-01-13

## 2023-01-13 RX ORDER — AMLODIPINE BESYLATE 10 MG/1
10 TABLET ORAL
Qty: 90 | Refills: 1 | Status: ACTIVE | COMMUNITY
Start: 2022-04-22

## 2023-01-13 RX ORDER — DICLOFENAC SODIUM 75 MG/1
75 TABLET, DELAYED RELEASE ORAL
Qty: 30 | Refills: 2 | Status: DISCONTINUED | COMMUNITY
Start: 2022-08-03 | End: 2023-01-13

## 2023-01-13 RX ORDER — HYDROCODONE BITARTRATE AND ACETAMINOPHEN 5; 325 MG/1; MG/1
5-325 TABLET ORAL
Qty: 60 | Refills: 0 | Status: DISCONTINUED | COMMUNITY
Start: 2022-07-06 | End: 2023-01-13

## 2023-01-16 ENCOUNTER — FORM ENCOUNTER (OUTPATIENT)
Age: 63
End: 2023-01-16

## 2023-01-16 NOTE — REVIEW OF SYSTEMS
[Muscle Pain] : muscle pain [Muscle Weakness] : muscle weakness [Back Pain] : back pain [Fever] : no fever [Chills] : no chills [Fatigue] : no fatigue [Discharge] : no discharge [Vision Problems] : no vision problems [Earache] : no earache [Nasal Discharge] : no nasal discharge [Sore Throat] : no sore throat [Hoarseness] : no hoarseness [Chest Pain] : no chest pain [Palpitations] : no palpitations [Lower Ext Edema] : no lower extremity edema [Orthopena] : no orthopnea [Paroxysmal Nocturnal Dyspnea] : no paroxysmal nocturnal dyspnea [Shortness Of Breath] : no shortness of breath [Wheezing] : no wheezing [Cough] : no cough [Dyspnea on Exertion] : not dyspnea on exertion [Abdominal Pain] : no abdominal pain [Nausea] : no nausea [Diarrhea] : no diarrhea [Vomiting] : no vomiting [Heartburn] : no heartburn [Skin Rash] : no skin rash [Headache] : no headache [Dizziness] : no dizziness [Fainting] : no fainting [Unsteady Walk] : no ataxia [Easy Bleeding] : no easy bleeding [Easy Bruising] : no easy bruising

## 2023-01-16 NOTE — ADDENDUM
[FreeTextEntry1] : Cxray normal. \par \par \par At this time the patient is medically stable to proceed with proposed procedure pending cardiac evaluation and clearance. \par \par Thank you for allowing me the opportunity to be part of your patients care. \par \par Kind Regards, \par \par Johnnie Ocasio NP \par [facility]\par 
Abdominal Pain, N/V/D
No

## 2023-01-16 NOTE — PLAN
[FreeTextEntry1] : Reviewed [facility] EKG and Lab results \par \par EKG - Sinus Bradycardia- 48, RYANN - 0.184, . Inverted T waves in precordial leads. Abnormal EKG  \par Patient pending Cardiac Clearance [facility] [address] · [phone].\par \par CBC unremarkable \par BMP; mild elevation of Scr and GFR normal t 78. Increase fluid intake and advised patient to follow up with PCP to for repeat in 3 mos. \par HGBa1c normal at 5.8 given reference range of 3.0-6.0 and not traditional range with ADA guidelines of less than 5.6. No f/u needed. \par PT/INR/aPPT- WNL \par UA and C&S negative \par \par MRSA/SA colonization positive; Dr. Wilson aware and patient started on Mupirocin BID for 5 days. \par \par Nicotine use\par - Patient stopped smoking 2 weeks prior to surgery as advised by Dr. Wilson to improve surgical outcomes. Patient on nicotine patch at this time. Advised to continue smoking cessation post operatively. \par - Cxray ordered due to marylin. \par \par Patient to discontinue NSAIDS and herbal supplement at this time. \par Patient to continue with present medications.  \par Increase fluid intake.\par \par 45 minutes was spent with patient reviewing findings/results during this visit. Ample time was provided to answer any questions or address concerns to the patients satisfaction..\par \par

## 2023-01-16 NOTE — HISTORY OF PRESENT ILLNESS
[FreeTextEntry1] : Pre-operative medical evaluation  [de-identified] : Patient presents today for pre-operative medical evaluation as requested by Dr. Wilson for L4-L5 laminectomy fusion which is scheduled to be performed on 1-. Patient complains of lower back pain radiating to right calf. He states on some nights after lying down he is unable to get up.  He states he is otherwise feeling well and has no other complaints at this time. He had an infection of the lumbar spine in August 2022 after receiving an epidural injection. He was hospitalized and the infection resolved in October 2022 and was discharge from ID. He denies any additional complaints at this time.  Denies any chest pain, shortness of breath, fever, chills, cough or cold type symptoms, bowel or bladder changes. \par Patient performed PST at [facility]. \par \par Patient is obtaining cardiac clearance from [facility] [address] [phone]. He performed a stress test today and is pending clearance. \par \par \par Cardiac: no aortic stenosis, no atrial fibrillation, no coronary artery disease, no recent myocardial infarction and no implantable device/pacemaker. \par Pulmonary: no asthma, no COPD, no sleep apnea and not a smoker. \par Adverse Anesthesia Reaction: no adverse anesthesia reaction in self or family member, but no family member with adverse anesthesia reaction/sudden death and no previous adverse anesthesia reaction. \par Other: no chronic anticoagulation, no chronic kidney disease and no diabetes. \par \par Cardiovascular Symptoms: Patient denies any chest pain, claudication, dyspnea on exertion, orthopnea, palpitations or syncope \par Functional Capacity: Good (7-10 METs).\par

## 2023-01-16 NOTE — PHYSICAL EXAM
[No Acute Distress] : no acute distress [Well Nourished] : well nourished [Well Developed] : well developed [Well-Appearing] : well-appearing [Normal Sclera/Conjunctiva] : normal sclera/conjunctiva [PERRL] : pupils equal round and reactive to light [EOMI] : extraocular movements intact [Normal Outer Ear/Nose] : the outer ears and nose were normal in appearance [Normal Oropharynx] : the oropharynx was normal [Normal TMs] : both tympanic membranes were normal [No Lymphadenopathy] : no lymphadenopathy [Supple] : supple [No Respiratory Distress] : no respiratory distress  [No Accessory Muscle Use] : no accessory muscle use [Clear to Auscultation] : lungs were clear to auscultation bilaterally [Normal Rate] : normal rate  [Regular Rhythm] : with a regular rhythm [Normal S1, S2] : normal S1 and S2 [No Murmur] : no murmur heard [Soft] : abdomen soft [Non Tender] : non-tender [Non-distended] : non-distended [No Masses] : no abdominal mass palpated [Normal Anterior Cervical Nodes] : no anterior cervical lymphadenopathy [No CVA Tenderness] : no CVA  tenderness [Coordination Grossly Intact] : coordination grossly intact [No Focal Deficits] : no focal deficits [Normal Gait] : normal gait [Deep Tendon Reflexes (DTR)] : deep tendon reflexes were 2+ and symmetric [Speech Grossly Normal] : speech grossly normal [Memory Grossly Normal] : memory grossly normal [Normal Affect] : the affect was normal [Normal Mood] : the mood was normal [Normal Insight/Judgement] : insight and judgment were intact [de-identified] : Upper dentures  [de-identified] : Lumbar spine ttp with right paraspinal tenderness. + Straight leg test right leg.  [de-identified] : 4/5 strength right hip flexion and knee extension [de-identified] : No visible rash

## 2023-01-17 ENCOUNTER — FORM ENCOUNTER (OUTPATIENT)
Age: 63
End: 2023-01-17

## 2023-01-17 ENCOUNTER — APPOINTMENT (OUTPATIENT)
Dept: ORTHOPEDIC SURGERY | Facility: HOSPITAL | Age: 63
End: 2023-01-17
Payer: OTHER MISCELLANEOUS

## 2023-01-17 PROCEDURE — 22840 INSERT SPINE FIXATION DEVICE: CPT | Mod: 80

## 2023-01-17 PROCEDURE — 63056 DECOMPRESS SPINAL CORD LMBR: CPT | Mod: 62

## 2023-01-17 PROCEDURE — 20936 SP BONE AGRFT LOCAL ADD-ON: CPT

## 2023-01-17 PROCEDURE — 22612 ARTHRD PST TQ 1NTRSPC LUMBAR: CPT | Mod: 62

## 2023-01-17 PROCEDURE — 22840 INSERT SPINE FIXATION DEVICE: CPT

## 2023-01-25 ENCOUNTER — APPOINTMENT (OUTPATIENT)
Dept: ORTHOPEDIC SURGERY | Facility: CLINIC | Age: 63
End: 2023-01-25
Payer: OTHER MISCELLANEOUS

## 2023-01-25 VITALS — HEIGHT: 66 IN | WEIGHT: 213 LBS | BODY MASS INDEX: 34.23 KG/M2

## 2023-01-25 PROCEDURE — 99024 POSTOP FOLLOW-UP VISIT: CPT

## 2023-01-30 ENCOUNTER — FORM ENCOUNTER (OUTPATIENT)
Age: 63
End: 2023-01-30

## 2023-02-01 ENCOUNTER — APPOINTMENT (OUTPATIENT)
Dept: ORTHOPEDIC SURGERY | Facility: CLINIC | Age: 63
End: 2023-02-01
Payer: OTHER MISCELLANEOUS

## 2023-02-01 ENCOUNTER — RESULT CHARGE (OUTPATIENT)
Age: 63
End: 2023-02-01

## 2023-02-01 VITALS — BODY MASS INDEX: 34.23 KG/M2 | WEIGHT: 213 LBS | HEIGHT: 66 IN

## 2023-02-01 PROCEDURE — 99072 ADDL SUPL MATRL&STAF TM PHE: CPT

## 2023-02-01 PROCEDURE — 72100 X-RAY EXAM L-S SPINE 2/3 VWS: CPT

## 2023-02-01 PROCEDURE — 99024 POSTOP FOLLOW-UP VISIT: CPT

## 2023-02-15 ENCOUNTER — APPOINTMENT (OUTPATIENT)
Dept: ORTHOPEDIC SURGERY | Facility: CLINIC | Age: 63
End: 2023-02-15
Payer: OTHER MISCELLANEOUS

## 2023-02-15 VITALS — HEIGHT: 66 IN | WEIGHT: 213 LBS | BODY MASS INDEX: 34.23 KG/M2

## 2023-02-15 PROCEDURE — 72100 X-RAY EXAM L-S SPINE 2/3 VWS: CPT

## 2023-02-15 PROCEDURE — 99024 POSTOP FOLLOW-UP VISIT: CPT

## 2023-02-15 NOTE — HISTORY OF PRESENT ILLNESS
[Lower back] : lower back [Work related] : work related [6] : 6 [Not working due to injury] : Work status: not working due to injury [de-identified] : 02/15/23: Patient presents for s/p L4-5 laminectomy fusion with full right facetotomy and a posterior spine fusion for iatrogenic instabtility (1/17/23). He started PT which is making his legs sore, right > left. Patient denies fevers, acute weakness, unexpected weight loss, urinary incontinence, and bowel incontinence. Patient feels stiffness. \par \par 02/01/2023: Patient presents for a 15 days S/P L4-L5 laminectomy fusion with full right facetotomy and a posterior spine fusion for iatrogenic instability (1/17/23). He presents for suture removal.   [] : no [FreeTextEntry3] : 3/7/22 [FreeTextEntry6] : stiffness [de-identified] : WC has not approved all PT, reports only having one PT session [de-identified] : 1/17/23 [de-identified] : decompressive laminectomy with complete right-sided L4-L5 facetectomy for decompression of stenotic neuroforamen and L$ nerve root. Posterolateral arthrodesis L4-L5. Posterior non-segmental spinal instrumentation L4-L5. \par  \par

## 2023-02-15 NOTE — IMAGING
[de-identified] : incision is clean dry and intact.  no redness, swelling, or pain at the site.

## 2023-02-15 NOTE — HISTORY OF PRESENT ILLNESS
[Lower back] : lower back [5] : 5 [Dull/Aching] : dull/aching [Work related] : work related [de-identified] : 02/01/2023: Patient presents for a 15 days S/P L4-L5 laminectomy fusion with full right facetotomy and a posterior spine fusion for iatrogenic instability (1/17/23). He presents for suture removal.   [] : no [de-identified] : Pt is 8 days PO, he has concern about incision site reports it to be red, itchy and the bandage to be falling off.  [de-identified] : 1/17/23 [de-identified] : decompressive laminectomy with complete right-sided L4-L5 facetectomy for decompression of stenotic neuroforamen and L$ nerve root. Posterolateral arthrodesis L4-L5. Posterior non-segmental spinal instrumentation L4-L5

## 2023-02-15 NOTE — PHYSICAL EXAM
[Bending to left] : bending to left [Bending to right] : bending to right [Flexion] : flexion [Extension] : extension [] : negative sitting straight leg raise [de-identified] : uses walker  [FreeTextEntry8] : n [de-identified] : no radicular leg pain [FreeTextEntry1] : no signs of hardware failure at L4-L5 s/p psf

## 2023-02-15 NOTE — ASSESSMENT
[FreeTextEntry1] : 61 y/o male S/P L4-L5 laminectomy fusion with full right facetectomy and a posterior spine fusion (1/19/23). Patient will continue with current physical therapy routine and incorporate activities taught into their daily life. Patient will follow up in 1 month. Continue to taper off narcotic medication.\par \par Prior to appointment and during encounter with patient extensive medical records were reviewed including but not limited to, hospital records, out patient records, imaging results, and lab data. During this appointment the patient was examined, diagnoses were discussed and explained in a face to face manner. In addition extensive time was spent reviewing aforementioned diagnostic studies. Counseling including abnormal image results, differential diagnoses, treatment options, risk and benefits, lifestyle changes, current condition, and current medications was performed. Patient's comments, questions, and concerns were address and patient verbalized understanding. Based on this patient's presentation at our office, which is an orthopedic spine surgeon's office, this patient inherently / intrinsically has a risk, however minute, of developing issues such as Cauda equina syndrome, bowel and bladder changes, or progression of motor or neurological deficits such as paralysis which may be permanent.\par \par ARTUR, Elizabeth Solitario, attest that this documentation has been prepared under the direction and in the presence of provider Philipp Wilson MD. \par

## 2023-02-15 NOTE — ASSESSMENT
[FreeTextEntry1] : 61 y/o male 15 days S/P L4-L5 laminectomy fusion with full right facetotomy and a posterior spine fusion for iatrogenic instability (1/19/23). Patient had staples removed and steri strip placed.  F/u in 2 weeks for 2nd post op visit. No driving at this time. \par \par Prior to appointment and during encounter with patient extensive medical records were reviewed including but not limited to, hospital records, out patient records, imaging results, and lab data. During this appointment the patient was examined, diagnoses were discussed and explained in a face to face manner. In addition extensive time was spent reviewing aforementioned diagnostic studies. Counseling including abnormal image results, differential diagnoses, treatment options, risk and benefits, lifestyle changes, current condition, and current medications was performed. Patient's comments, questions, and concerns were address and patient verbalized understanding. Based on this patient's presentation at our office, which is an orthopedic spine surgeon's office, this patient inherently / intrinsically has a risk, however minute, of developing issues such as Cauda equina syndrome, bowel and bladder changes, or progression of motor or neurological deficits such as paralysis which may be permanent.\par \par Vinny SIDDIQUI, FNP attest that this documentation has been prepared under the direction and in the presence of Provider Dr. Philipp Wilson. \par

## 2023-02-15 NOTE — PHYSICAL EXAM
[Bending to left] : bending to left [Bending to right] : bending to right [Flexion] : flexion [Extension] : extension [de-identified] : Constitutional:\par - General Appearance:\par Unremarkable\par Body Habitus\par Well Developed\par Well Nourished\par Body Habitus\par No Deformities\par Well Groomed\par Ability To communicate:\par Normal\par Neurologic:\par Global sensation is intact to upper and lower extremities. See examination of Neck and/or Spine\par for exceptions.\par Orientation to Time, Place and Person is: Normal\par Mood And Affect is Normal\par Skin:\par - Head/Face, Right Upper/Lower Extremity, Left Upper/Lower Extremity: Normal\par See Examination of Neck and/or Spine for exceptions\par Cardiovascular:\par Peripheral Cardiovascular System is Normal\par Palpation of Lymph Nodes:\par Normal Palpation of lymph nodes in: Axilla, Cervical, Inguinal\par Abnormal Palpation of lymph nodes in: None  [] : clonus not sustained at ankle [de-identified] : uses walker  [de-identified] : no radicular leg pain [FreeTextEntry1] : no signs of hardware failure at L4-L5 s/p psf, multi level DDD, mild scoliotic deformity through the TL spine

## 2023-02-15 NOTE — HISTORY OF PRESENT ILLNESS
[Lower back] : lower back [Work related] : work related [] : Post Surgical Visit: yes [de-identified] : 1/25/23: Patient presents for a 6 days S/P L4-L5 laminectomy fusion with full right facetotomy and a posterior spine fusion for iatrogenic instability (1/19/23). He presents due to itchiness near the incision.  [de-identified] : Pt is 8 days PO, he has concern about incision site reports it to be red, itchy and the bandage to be falling off.  [de-identified] : 1/17/23 [de-identified] : decompressive laminectomy with complete right-sided L4-L5 facetectomy for decompression of stenotic neuroforamen and L$ nerve root. Posterolateral arthrodesis L4-L5. Posterior non-segmental spinal instrumentation L4-L5

## 2023-02-15 NOTE — IMAGING
[de-identified] : incision is clean dry and intact.  no redness, swelling, or pain at the site.

## 2023-02-15 NOTE — ASSESSMENT
[FreeTextEntry1] : 63 y/o male 6 days S/P L4-L5 laminectomy fusion with full right facetotomy and a posterior spine fusion for iatrogenic instability (1/19/23). Discussed proper post-op care for incision with Tegaderm. Patient will continue current medical regimen. Patient will follow up in 1 week. \par \par Prior to appointment and during encounter with patient extensive medical records were reviewed including but not limited to, hospital records, out patient records, imaging results, and lab data. During this appointment the patient was examined, diagnoses were discussed and explained in a face to face manner. In addition extensive time was spent reviewing aforementioned diagnostic studies. Counseling including abnormal image results, differential diagnoses, treatment options, risk and benefits, lifestyle changes, current condition, and current medications was performed. Patient's comments, questions, and concerns were address and patient verbalized understanding. Based on this patient's presentation at our office, which is an orthopedic spine surgeon's office, this patient inherently / intrinsically has a risk, however minute, of developing issues such as Cauda equina syndrome, bowel and bladder changes, or progression of motor or neurological deficits such as paralysis which may be permanent.\par \par Seema SIDDIQUI attest that this documentation has been prepared under the direction and in the presence of Provider Dr. Philipp Wilson. \par

## 2023-03-15 ENCOUNTER — APPOINTMENT (OUTPATIENT)
Dept: ORTHOPEDIC SURGERY | Facility: CLINIC | Age: 63
End: 2023-03-15
Payer: OTHER MISCELLANEOUS

## 2023-03-15 VITALS — WEIGHT: 213 LBS | BODY MASS INDEX: 34.23 KG/M2 | HEIGHT: 66 IN

## 2023-03-15 PROCEDURE — 72100 X-RAY EXAM L-S SPINE 2/3 VWS: CPT

## 2023-03-15 PROCEDURE — 99024 POSTOP FOLLOW-UP VISIT: CPT

## 2023-03-15 RX ORDER — OXYCODONE 5 MG/1
5 TABLET ORAL EVERY 8 HOURS
Qty: 30 | Refills: 0 | Status: ACTIVE | COMMUNITY
Start: 2023-03-15 | End: 1900-01-01

## 2023-03-15 RX ORDER — METHOCARBAMOL 750 MG/1
750 TABLET, FILM COATED ORAL EVERY 8 HOURS
Qty: 60 | Refills: 0 | Status: ACTIVE | COMMUNITY
Start: 2023-03-15 | End: 1900-01-01

## 2023-03-15 RX ORDER — OXYCODONE 10 MG/1
10 TABLET ORAL EVERY 8 HOURS
Qty: 30 | Refills: 0 | Status: DISCONTINUED | COMMUNITY
Start: 2023-02-01 | End: 2023-03-15

## 2023-03-15 NOTE — PHYSICAL EXAM
[Flexion] : flexion [Extension] : extension [Bending to left] : bending to left [Bending to right] : bending to right [de-identified] : Constitutional:\par - General Appearance:\par Unremarkable\par Body Habitus\par Well Developed\par Well Nourished\par Body Habitus\par No Deformities\par Well Groomed\par Ability To communicate:\par Normal\par Neurologic:\par Global sensation is intact to upper and lower extremities. See examination of Neck and/or Spine\par for exceptions.\par Orientation to Time, Place and Person is: Normal\par Mood And Affect is Normal\par Skin:\par - Head/Face, Right Upper/Lower Extremity, Left Upper/Lower Extremity: Normal\par See Examination of Neck and/or Spine for exceptions\par Cardiovascular:\par Peripheral Cardiovascular System is Normal\par Palpation of Lymph Nodes:\par Normal Palpation of lymph nodes in: Axilla, Cervical, Inguinal\par Abnormal Palpation of lymph nodes in: None  [] : clonus not sustained at ankle [FreeTextEntry1] : On my interpretations of these images from [facility] in house on 03/15/2023: no signs of hardware failure at L4-L5 s/p psf, multi level DDD, mild scoliotic deformity through the TL spine  [TWNoteComboBox7] : forward flexion 30 degrees [de-identified] : extension 10 degrees

## 2023-03-15 NOTE — ASSESSMENT
[FreeTextEntry1] : 61 y/o male S/P L4-L5 laminectomy fusion with full right facetectomy and a posterior spine fusion (1/19/23). Patient will continue with current physical therapy routine, renewal given and incorporate activities taught into their daily life. Patient will d/c of 10mg oxycodone, prescribed 5mg oxycodone to continue to taper off narcotic medication. I am prescribing the patient methocarbamol. Medication management and risks reviewed. Patient will follow up in 1 month. \par \par Prior to appointment and during encounter with patient extensive medical records were reviewed including but not limited to, hospital records, out patient records, imaging results, and lab data. During this appointment the patient was examined, diagnoses were discussed and explained in a face to face manner. In addition extensive time was spent reviewing aforementioned diagnostic studies. Counseling including abnormal image results, differential diagnoses, treatment options, risk and benefits, lifestyle changes, current condition, and current medications was performed. Patient's comments, questions, and concerns were address and patient verbalized understanding. Based on this patient's presentation at our office, which is an orthopedic spine surgeon's office, this patient inherently / intrinsically has a risk, however minute, of developing issues such as Cauda equina syndrome, bowel and bladder changes, or progression of motor or neurological deficits such as paralysis which may be permanent.\par \par ARTUR, Elizabeth Solitario, attest that this documentation has been prepared under the direction and in the presence of provider Philipp Wilson MD. \par

## 2023-04-12 ENCOUNTER — APPOINTMENT (OUTPATIENT)
Dept: ORTHOPEDIC SURGERY | Facility: CLINIC | Age: 63
End: 2023-04-12
Payer: OTHER MISCELLANEOUS

## 2023-04-12 VITALS — BODY MASS INDEX: 34.23 KG/M2 | WEIGHT: 213 LBS | HEIGHT: 66 IN

## 2023-04-12 PROCEDURE — 99214 OFFICE O/P EST MOD 30 MIN: CPT | Mod: 24

## 2023-04-12 PROCEDURE — 72100 X-RAY EXAM L-S SPINE 2/3 VWS: CPT

## 2023-04-13 RX ORDER — TRAMADOL HYDROCHLORIDE 50 MG/1
50 TABLET, COATED ORAL EVERY 6 HOURS
Qty: 40 | Refills: 0 | Status: ACTIVE | COMMUNITY
Start: 2023-04-12 | End: 1900-01-01

## 2023-04-14 NOTE — HISTORY OF PRESENT ILLNESS
[6] : 6 [7] : 7 [Not working due to injury] : Work status: not working due to injury [] : yes [de-identified] : 62 M 3 months s/p L4/5 laminectomy, facetectomy and PSF.  Continues to complain of back pain and stiffness.  No complaints of radicular pain.  Is attending PT.  Continues to use oxycodone and is requesting more pain medication.\par \par  [de-identified] : p/t

## 2023-04-14 NOTE — PHYSICAL EXAM
[Normal Coordination] : normal coordination [Normal DTR UE/LE] : normal DTR UE/LE  [Normal Sensation] : normal sensation [Normal Mood and Affect] : normal mood and affect [Orientated] : orientated [Normal Skin] : normal skin [No Rash] : no rash [No Ulcers] : no ulcers [No Lesions] : no lesions [No obvious lymphadenopathy in areas examined] : no obvious lymphadenopathy in areas examined [Bending to left] : bending to left [Bending to right] : bending to right [Flexion] : flexion [Extension] : extension [] : patient ambulates without assistive device [Implants in position] : Implants in position [No loosening of hardware] : No loosening of hardware [FreeTextEntry1] : L4/5 well placed implants.

## 2023-04-14 NOTE — ASSESSMENT
[FreeTextEntry1] : 62 M 3mo s/p laminectomy and fusion L4/5 for severe formainal stenosis and RLE radiculopathy.  Radicular pain & paraesthesias are well resolved.  Continues to complain of postoperative back pain.  He should continue PT.  We need to wean from the oxycodone at 3 months postop.  He is now bein ordered Tramadol.  If there are ongoing needs for pain medication, referral to outpatient chronic pain mgmt may be required.  F/U in 3 months.

## 2023-07-12 ENCOUNTER — APPOINTMENT (OUTPATIENT)
Dept: ORTHOPEDIC SURGERY | Facility: CLINIC | Age: 63
End: 2023-07-12

## 2023-08-04 ENCOUNTER — APPOINTMENT (OUTPATIENT)
Dept: ORTHOPEDIC SURGERY | Facility: CLINIC | Age: 63
End: 2023-08-04
Payer: OTHER MISCELLANEOUS

## 2023-08-04 VITALS — WEIGHT: 195 LBS | HEIGHT: 66 IN | BODY MASS INDEX: 31.34 KG/M2

## 2023-08-04 DIAGNOSIS — M54.16 RADICULOPATHY, LUMBAR REGION: ICD-10-CM

## 2023-08-04 DIAGNOSIS — Z98.1 ARTHRODESIS STATUS: ICD-10-CM

## 2023-08-04 DIAGNOSIS — M48.061 SPINAL STENOSIS, LUMBAR REGION WITHOUT NEUROGENIC CLAUDICATION: ICD-10-CM

## 2023-08-04 DIAGNOSIS — M51.26 OTHER INTERVERTEBRAL DISC DISPLACEMENT, LUMBAR REGION: ICD-10-CM

## 2023-08-04 PROCEDURE — 99214 OFFICE O/P EST MOD 30 MIN: CPT

## 2023-08-04 PROCEDURE — 72100 X-RAY EXAM L-S SPINE 2/3 VWS: CPT

## 2023-08-09 NOTE — ASSESSMENT
[FreeTextEntry1] : 64 y/o male s/p L4-5 PSF with axial pain, no neurologic symptoms. 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 Patient continues to be out of work and mild-moderate partial disability. F/U in 6 months.   Prior to appointment and during encounter with patient extensive medical records were reviewed including but not limited to, hospital records, out patient records, imaging results, and lab data. During this appointment the patient was examined, diagnoses were discussed and explained in a face to face manner. In addition extensive time was spent reviewing aforementioned diagnostic studies. Counseling including abnormal image results, differential diagnoses, treatment options, risk and benefits, lifestyle changes, current condition, and current medications was performed. Patient's comments, questions, and concerns were address and patient verbalized understanding. Based on this patient's presentation at our office, which is an orthopedic spine surgeon's office, this patient inherently / intrinsically has a risk, however minute, of developing issues such as Cauda equina syndrome, bowel and bladder changes, or progression of motor or neurological deficits such as paralysis which may be permanent.   I, Perla Barton, attest that this documentation has been prepared under the direction and in the presence of provider Philipp Wilson MD.

## 2023-08-09 NOTE — HISTORY OF PRESENT ILLNESS
[5] : 5 [6] : 6 [Not working due to injury] : Work status: not working due to injury [] : yes [de-identified] : 08/04/2023: Patient presents s/p L4/5 laminectomy, facetectomy and PSF. He reports back pain, moderate leg pain.   States pain lying down. States sitting pain level is a 4/10. When active pain level is a 6-7/10.  04/12/2023: 62 M 3 months s/p L4/5 laminectomy, facetectomy and PSF.  Continues to complain of back pain and stiffness.  No complaints of radicular pain.  Is attending PT.  Continues to use oxycodone and is requesting more pain medication.   [de-identified] : p/t

## 2023-08-09 NOTE — PHYSICAL EXAM
[Normal Coordination] : normal coordination [Normal DTR UE/LE] : normal DTR UE/LE  [Normal Sensation] : normal sensation [Normal Mood and Affect] : normal mood and affect [Orientated] : orientated [Normal Skin] : normal skin [No Rash] : no rash [No Ulcers] : no ulcers [No Lesions] : no lesions [No obvious lymphadenopathy in areas examined] : no obvious lymphadenopathy in areas examined [Bending to left] : bending to left [Bending to right] : bending to right [Flexion] : flexion [Extension] : extension [Implants in position] : Implants in position [No loosening of hardware] : No loosening of hardware [] : clonus not sustained at ankle [FreeTextEntry1] : On my interpretations of these images from [facility] in house on 08/04/2023: s/p L4-5 PSF no signs of hardware loosening all hardware well placed.

## 2024-02-07 ENCOUNTER — APPOINTMENT (OUTPATIENT)
Dept: ORTHOPEDIC SURGERY | Facility: CLINIC | Age: 64
End: 2024-02-07

## 2025-08-12 ENCOUNTER — RESULT REVIEW (OUTPATIENT)
Age: 65
End: 2025-08-12

## 2025-08-18 ENCOUNTER — TRANSCRIPTION ENCOUNTER (OUTPATIENT)
Age: 65
End: 2025-08-18

## 2025-08-20 ENCOUNTER — TRANSCRIPTION ENCOUNTER (OUTPATIENT)
Age: 65
End: 2025-08-20

## 2025-08-20 ENCOUNTER — APPOINTMENT (OUTPATIENT)
Dept: CARDIOLOGY | Facility: CLINIC | Age: 65
End: 2025-08-20
Payer: MEDICAID

## 2025-08-20 VITALS — DIASTOLIC BLOOD PRESSURE: 62 MMHG | SYSTOLIC BLOOD PRESSURE: 130 MMHG

## 2025-08-20 VITALS
DIASTOLIC BLOOD PRESSURE: 66 MMHG | WEIGHT: 192 LBS | SYSTOLIC BLOOD PRESSURE: 132 MMHG | HEIGHT: 66 IN | OXYGEN SATURATION: 96 % | BODY MASS INDEX: 30.86 KG/M2 | HEART RATE: 62 BPM

## 2025-08-20 DIAGNOSIS — R94.31 ABNORMAL ELECTROCARDIOGRAM [ECG] [EKG]: ICD-10-CM

## 2025-08-20 DIAGNOSIS — Z78.9 OTHER SPECIFIED HEALTH STATUS: ICD-10-CM

## 2025-08-20 DIAGNOSIS — F17.200 NICOTINE DEPENDENCE, UNSPECIFIED, UNCOMPLICATED: ICD-10-CM

## 2025-08-20 DIAGNOSIS — I82.409 ACUTE EMBOLISM AND THROMBOSIS OF UNSPECIFIED DEEP VEINS OF UNSPECIFIED LOWER EXTREMITY: ICD-10-CM

## 2025-08-20 DIAGNOSIS — R00.1 BRADYCARDIA, UNSPECIFIED: ICD-10-CM

## 2025-08-20 DIAGNOSIS — Z95.5 PRESENCE OF CORONARY ANGIOPLASTY IMPLANT AND GRAFT: ICD-10-CM

## 2025-08-20 DIAGNOSIS — I10 ESSENTIAL (PRIMARY) HYPERTENSION: ICD-10-CM

## 2025-08-20 DIAGNOSIS — E78.5 HYPERLIPIDEMIA, UNSPECIFIED: ICD-10-CM

## 2025-08-20 DIAGNOSIS — F17.210 NICOTINE DEPENDENCE, CIGARETTES, UNCOMPLICATED: ICD-10-CM

## 2025-08-20 DIAGNOSIS — I21.4 NON-ST ELEVATION (NSTEMI) MYOCARDIAL INFARCTION: ICD-10-CM

## 2025-08-20 PROCEDURE — 99205 OFFICE O/P NEW HI 60 MIN: CPT

## 2025-08-20 PROCEDURE — 93000 ELECTROCARDIOGRAM COMPLETE: CPT

## 2025-08-20 RX ORDER — APIXABAN 5 MG/1
5 TABLET, FILM COATED ORAL
Qty: 180 | Refills: 3 | Status: ACTIVE | COMMUNITY

## 2025-08-20 RX ORDER — CLOPIDOGREL BISULFATE 75 MG/1
75 TABLET, FILM COATED ORAL
Qty: 90 | Refills: 3 | Status: ACTIVE | COMMUNITY

## 2025-08-20 RX ORDER — NIFEDIPINE 60 MG/1
60 TABLET, EXTENDED RELEASE ORAL
Qty: 90 | Refills: 3 | Status: ACTIVE | COMMUNITY

## 2025-08-20 RX ORDER — CARVEDILOL 12.5 MG/1
12.5 TABLET, FILM COATED ORAL TWICE DAILY
Qty: 180 | Refills: 3 | Status: ACTIVE | COMMUNITY

## 2025-08-20 RX ORDER — ATORVASTATIN CALCIUM 40 MG/1
40 TABLET, FILM COATED ORAL
Qty: 90 | Refills: 3 | Status: ACTIVE | COMMUNITY

## 2025-08-20 RX ORDER — OMEPRAZOLE 40 MG/1
40 CAPSULE, DELAYED RELEASE ORAL
Qty: 90 | Refills: 3 | Status: ACTIVE | COMMUNITY

## 2025-08-21 ENCOUNTER — APPOINTMENT (OUTPATIENT)
Dept: CARDIOLOGY | Facility: CLINIC | Age: 65
End: 2025-08-21

## 2025-08-21 DIAGNOSIS — I70.1 ATHEROSCLEROSIS OF RENAL ARTERY: ICD-10-CM

## 2025-08-25 ENCOUNTER — TRANSCRIPTION ENCOUNTER (OUTPATIENT)
Age: 65
End: 2025-08-25

## 2025-09-02 ENCOUNTER — TRANSCRIPTION ENCOUNTER (OUTPATIENT)
Age: 65
End: 2025-09-02

## 2025-09-03 ENCOUNTER — TRANSCRIPTION ENCOUNTER (OUTPATIENT)
Age: 65
End: 2025-09-03

## 2025-09-11 ENCOUNTER — TRANSCRIPTION ENCOUNTER (OUTPATIENT)
Age: 65
End: 2025-09-11